# Patient Record
Sex: MALE | HISPANIC OR LATINO | URBAN - METROPOLITAN AREA
[De-identification: names, ages, dates, MRNs, and addresses within clinical notes are randomized per-mention and may not be internally consistent; named-entity substitution may affect disease eponyms.]

---

## 2018-08-05 ENCOUNTER — EMERGENCY (EMERGENCY)
Facility: HOSPITAL | Age: 43
LOS: 1 days | Discharge: HOME | End: 2018-08-05
Attending: EMERGENCY MEDICINE
Payer: MEDICAID

## 2018-08-05 VITALS
OXYGEN SATURATION: 96 % | RESPIRATION RATE: 16 BRPM | HEART RATE: 86 BPM | TEMPERATURE: 98 F | SYSTOLIC BLOOD PRESSURE: 134 MMHG | HEIGHT: 69 IN | DIASTOLIC BLOOD PRESSURE: 94 MMHG | WEIGHT: 179.9 LBS

## 2018-08-05 PROCEDURE — 73630 X-RAY EXAM OF FOOT: CPT

## 2018-08-05 PROCEDURE — 73630 X-RAY EXAM OF FOOT: CPT | Mod: 26,LT

## 2018-08-05 PROCEDURE — 11740 EVACUATION SUBUNGUAL HMTMA: CPT | Mod: LT

## 2018-08-05 PROCEDURE — 11740 EVACUATION SUBUNGUAL HMTMA: CPT | Mod: TA

## 2018-08-05 PROCEDURE — 99283 EMERGENCY DEPT VISIT LOW MDM: CPT | Mod: 25

## 2018-08-05 RX ORDER — ACETAMINOPHEN 500 MG
975 TABLET ORAL ONCE
Qty: 0 | Refills: 0 | Status: COMPLETED | OUTPATIENT
Start: 2018-08-05 | End: 2018-08-05

## 2018-08-05 RX ADMIN — Medication 975 MILLIGRAM(S): at 20:30

## 2018-08-05 NOTE — ED PROVIDER NOTE - PHYSICAL EXAMINATION
Gen: NAD, AOx3, able to make his needs known non-toxic //            Head: NCAT //              Lung: CTAB, no respiratory distress, no wheezes/rhonchi/rales B/L, speaking in full sentences. //            CV: RRR, no murmurs or rubs //            Abd: soft, NTND, no guarding //            MSK: subungal hematoma appreciated of L toe. Mild errythema. Able to flex toe. tender to palpation. no visible deformities elsewhere//            Neuro: No focal sensory or motor deficits //            Skin: Warm, well perfused, no rash //            Psych: normal affect.

## 2018-08-05 NOTE — ED PROVIDER NOTE - MEDICAL DECISION MAKING DETAILS
Yarao: 42 y/o M pt w/ toe pain. Subungal hematoma, trephination with caudery. Xray to r/o fractures. Pain meds Annabelle: 42 y/o M pt w/ toe pain. Subungal hematoma, trephination with caudery. Xray to r/o fractures. Pain meds  Jeffrey: Patient with subungal hematoma at first left toe. will get xray to r/o fracture, trephination, dress and d/c home to f/u outpatient.

## 2018-08-05 NOTE — ED PROVIDER NOTE - NS ED ROS FT
GENERAL: No fever or chills, //             EYES: no change in vision, //             HEENT: no trouble swallowing or speaking, //             CARDIAC: no chest pain, //              PULMONARY: no cough or SOB, //             GI: no abdominal pain, no nausea or no vomiting, no diarrhea or constipation, //             : No changes in urination,  //            SKIN: no rashes,  //            NEURO: no headache,  //             MSK: L toe pain. otherwise as HPI or negative.

## 2018-08-05 NOTE — ED PROVIDER NOTE - OBJECTIVE STATEMENT
44 y/o M w/ PMH of HTN 44 y/o M w/ PMH of HTN presenting w/ L toe pain. Stated on Friday he was lifting a box when it fell on his L foot. Immediately experienced toe pain. Was able to walk on it. Yesterday he noticed bruising beneath his toenail. He just a pin to make a hole in his nail and was able to get some blood out. Currently only walking on his heel due to pain. Rates it 7/10, sharp, non radiating. No numbness or tingling. Able to flex toe, but experiences pain. No additional complaints at this time.

## 2018-08-05 NOTE — ED ADULT TRIAGE NOTE - CHIEF COMPLAINT QUOTE
yesterday pt dropped a heavy box on his left 1st toe. today it is ecchymotic on the nail, swollen and painful

## 2018-08-05 NOTE — ED PROCEDURE NOTE - PROCEDURE ADDITIONAL DETAILS
NVI pre and post procedure  Area was cleaned  Trephination performed with caudery device  Blood expressed with relief of pain  Pt tolerated well  Toe wrapped with dry sterile dressing  Dr. Sky Alanis, PGY-1

## 2019-10-09 ENCOUNTER — EMERGENCY (EMERGENCY)
Facility: HOSPITAL | Age: 44
LOS: 1 days | Discharge: HOME | End: 2019-10-09
Attending: EMERGENCY MEDICINE
Payer: SELF-PAY

## 2019-10-09 VITALS
DIASTOLIC BLOOD PRESSURE: 99 MMHG | WEIGHT: 199.96 LBS | RESPIRATION RATE: 16 BRPM | HEART RATE: 86 BPM | TEMPERATURE: 98 F | OXYGEN SATURATION: 98 % | HEIGHT: 69 IN | SYSTOLIC BLOOD PRESSURE: 151 MMHG

## 2019-10-09 PROBLEM — I10 ESSENTIAL (PRIMARY) HYPERTENSION: Chronic | Status: ACTIVE | Noted: 2018-08-05

## 2019-10-09 PROCEDURE — 99282 EMERGENCY DEPT VISIT SF MDM: CPT | Mod: 25

## 2019-10-09 PROCEDURE — 99283 EMERGENCY DEPT VISIT LOW MDM: CPT | Mod: 25

## 2019-10-09 PROCEDURE — 64400 NJX AA&/STRD TRIGEMINAL NRV: CPT | Mod: 26

## 2019-10-09 PROCEDURE — 64400 NJX AA&/STRD TRIGEMINAL NRV: CPT | Mod: RT

## 2019-10-09 PROCEDURE — 99053 MED SERV 10PM-8AM 24 HR FAC: CPT

## 2019-10-09 NOTE — ED ADULT NURSE NOTE - OBJECTIVE STATEMENT
44 yr old male arrived to the ED from home c/o dental pain x a few month. pt states he saw a dentist and was told he would need a root canal. pt endorses trying Tylenol and Motrin at home with no relief. upon assessment pt is A&Ox4, speaking clearly, answering questions and following commands. lungs clear sherman. no swelling noted to face. pt denies fevers, chills, nausea, vomiting, chest pain or sob

## 2019-10-09 NOTE — ED PROVIDER NOTE - PATIENT PORTAL LINK FT
You can access the FollowMyHealth Patient Portal offered by [facility] by registering at the following website: [email or web address] By joining [employer]’s FollowMyHealth portal, you will also be able to view your health information using other applications (apps) compatible with our system.

## 2019-10-09 NOTE — ED PROCEDURE NOTE - ATTENDING CONTRIBUTION TO CARE
Dental block performed by resident.  No cellulitis over area of injection.  Adequate pain relief after injection.  Strict return precautions given.

## 2019-10-09 NOTE — ED PROVIDER NOTE - OBJECTIVE STATEMENT
44 Y M with 3 days of L upper anterior dental pain, he said that he was having mild pain x months and saw a dentist and was told eventually that he would need a rooth canal. he says that he has been trying Ibuprofen, naproxen, and tylenols at home without much relief. he has still been tolerating PO without issue, he has no fever, nausea, vomiting, LOC.

## 2019-10-09 NOTE — ED PROVIDER NOTE - NSFOLLOWUPCLINICS_GEN_ALL_ED_FT
[facility]  Dental  [address] [place], [place] [id number]  Phone: [phone]  Fax:   Follow Up Time: 1-3 Days

## 2019-10-09 NOTE — ED PROVIDER NOTE - NSFOLLOWUPINSTRUCTIONS_ED_ALL_ED_FT
As we discussed you need to follow up with a dentist as soon as possible. If you have any severe increase in pain, fever, uncontrollable nausea vomiting, or inability to tolerate eating and drinking you need to come right back to the emergency room.

## 2019-10-09 NOTE — ED PROVIDER NOTE - PHYSICAL EXAMINATION
Mouth: Upper L 2nd molar TTP without surrounding signs of inflammation, there is no palpable fluid collection or periapical abscess.

## 2019-10-09 NOTE — ED PROVIDER NOTE - ATTENDING CONTRIBUTION TO CARE
44M    PMHx: HTN  PSHx: 44M with 3 days of L upper anterior dental pain, he said that he was having mild pain x months and saw a dentist and was told eventually that he would need a root canal. he says that he has been trying Ibuprofen, naproxen, and Tylenol at home without much relief. he has still been tolerating PO without issue, he has no fever, nausea, vomiting. No stridor, drooling or trismus.    PMHx: HTN  PSHx: Denies    PHYSICAL EXAMINATION:  VITALS REVIEWED.  VS hypertensive, otherwise normal  GENERALIZED APPEARANCE:  Comfortable, no acute distress, ambulating without difficulty  SKIN:  Warm, dry, no cyanosis  HEAD:  No obvious scalp lesions  EYES:  Conjunctiva pink, no icterus  ENMT:  Mucus membranes moist, no stridor, TEETH: upper L 2nd molar TTP without surrounding signs of inflammation, there is no palpable fluid collection or periapical abscess. Multiple fillings noted in other teeth; palate normal, no sign of elevated floor.  NECK:  Supple, non-tender  CHEST AND RESPIRATORY:  Clear to auscultation B/L, good air entry B/L, equal chest expansion  HEART AND CARDIOVASCULAR:  Regular rate, no obvious murmur  EXTREMITIES:  No deformity, edema, or calf tenderness  NEURO: AAOx3, gross motor and sensory intact    Impression:  Dental pain likely 2/2 cavities, no periapical abscesses noted  Dental block, pain management, f/u with Dental AM

## 2023-03-25 ENCOUNTER — EMERGENCY (EMERGENCY)
Facility: HOSPITAL | Age: 48
LOS: 1 days | Discharge: LEFT BEFORE DISCHARGE | End: 2023-03-25
Admitting: EMERGENCY MEDICINE
Payer: SELF-PAY

## 2023-03-25 PROCEDURE — L9992: CPT
